# Patient Record
Sex: FEMALE | Race: WHITE | NOT HISPANIC OR LATINO | Employment: OTHER
[De-identification: names, ages, dates, MRNs, and addresses within clinical notes are randomized per-mention and may not be internally consistent; named-entity substitution may affect disease eponyms.]

---

## 2024-02-07 ENCOUNTER — TRANSCRIBE ORDERS (OUTPATIENT)
Dept: OTHER | Age: 34
End: 2024-02-07

## 2024-02-07 DIAGNOSIS — M25.511 ACUTE PAIN OF RIGHT SHOULDER: Primary | ICD-10-CM

## 2024-02-08 ENCOUNTER — TRANSFERRED RECORDS (OUTPATIENT)
Dept: HEALTH INFORMATION MANAGEMENT | Facility: CLINIC | Age: 34
End: 2024-02-08
Payer: COMMERCIAL

## 2024-02-09 NOTE — PROGRESS NOTES
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  Symptoms started in May of 2023. There was a precipitating event where a metal water bottle fell on her shoulder. The pain is located to the posterior shoulder. Worst pain is rated a 9 of 10, and current pain is rated at 6 of 10. Symptoms are worsened by movement and lifting her arm. Symptoms are improved with rest. Patient has tried medrol dose pack with good relief. Associated symptoms include pain, weakness, and limited functionality. Patient has pain, coldness and tingling radiating down the arm, with numbness. Notably, the patient has had no history of surgery on the shoulder. No other concerns or complaints at this time.  SANE score R - 15/20 L - 85    PAST MEDICAL HISTORY:  No past medical history on file.    PAST SURGICAL HISTORY:  No past surgical history on file.    CURRENT MEDICATIONS:  No current outpatient medications on file.       ALLERGIES:    Not on File      FAMILY HISTORY: No pertinent family history, reviewed in EMR.    SOCIAL HISTORY:   Social History     Socioeconomic History    Marital status:      Spouse name: Not on file    Number of children: Not on file    Years of education: Not on file    Highest education level: Not on file   Occupational History    Not on file   Tobacco Use    Smoking status: Not on file    Smokeless tobacco: Not on file   Substance and Sexual Activity    Alcohol use: Not on file    Drug use: Not on file    Sexual activity: Not on file   Other Topics Concern    Not on file   Social History Narrative    Not on file     Social Determinants of Health     Financial Resource Strain: Not on file   Food Insecurity: Not on file   Transportation Needs: Not on file    Physical Activity: Not on file   Stress: Not on file   Social Connections: Not on file   Interpersonal Safety: Not on file   Housing Stability: Not on file       REVIEW OF SYSTEMS: Positive for that noted in past medical history and history of present illness and otherwise reviewed in EMR    PHYSICAL EXAM:  Patient is Data Unavailable and weighs 0 lbs 0 oz There were no vitals taken for this visit.  There is no height or weight on file to calculate BMI.   Constitutional: Well-developed, well-nourished, healthy appearing female.  Skin: Warm, dry   HEENT: Normal  Cardiac: Well perfused extremities, strong 2+ peripheral pulses. No edema.   Pulmonary: Breathing room air    Musculoskeletal:   Right Shoulder:  AROM right shoulder: 170/170/70/T10   AROM left shoulder: 170/170/70/T10   5/5 supraspinatus, 5/5 infraspinatus, 5/5 subscapularis  positive AC joint pain, positive cross body adduction  negative Neer and Figueroa impingement signs  negative belly-press/lift-off  negative Speed's test  negative Apprehension  No scapular winging  Neurovascular exam and cervical spine exam are normal.    IMAGING: I personally reviewed the prior x-rays and MRI which demonstrate no evidence of a rotator cuff tear, labral tear.  There are some edema within the distal clavicle and at the AC joint.  No muscle atrophy.    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     PLAN:     I discussed with the patient the etiology of their condition. We discussed at length the options as noted above.  I went through the results of the x-ray, MRI, EMG with the patient today.  I discussed with the patient that I believe that the pain is coming from both some scapular dyskinesis as well as from edema within the clavicle.  I talked about  conservative measures including and starting with physical therapy, the patient is only tried occupational therapy.  I believe the patient needs formal physical therapy to work on scapular motion and dyskinesis.  We also talked about an AC joint injection versus iontophoresis over the area.  Patient is going to pursue this with physical therapy.  A mental health referral was also made today.  Patient can follow-up with me as needed.    At the conclusion of the office visit, Alida verbally acknowledged that I answered all of her questions satisfactorily.    Haylee Garcia MD  Orthopedic Surgery Sports Medicine and Shoulder Surgery

## 2024-02-13 NOTE — TELEPHONE ENCOUNTER
DIAGNOSIS: RIGHT SHOULDER   APPOINTMENT DATE: 02/14/2024   NOTES STATUS DETAILS   OFFICE NOTE from referring provider Care Everywhere 02/02/2024 - Brennon Guzman MD - [facility]   OFFICE NOTE from other specialist Care Everywhere 09/26/2023 - Silke Wilson [facility]   MRI PACS [facility]:  12/22/2023 - RT Shoulder   XRAYS (IMAGES & REPORTS) PACS [facility]:  01/23/2024 - RT Shoulder  [facility]:  11/02/2023 - RT Clavicle

## 2024-02-14 ENCOUNTER — PRE VISIT (OUTPATIENT)
Dept: ORTHOPEDICS | Facility: CLINIC | Age: 34
End: 2024-02-14

## 2024-02-14 ENCOUNTER — OFFICE VISIT (OUTPATIENT)
Dept: ORTHOPEDICS | Facility: CLINIC | Age: 34
End: 2024-02-14
Attending: ORTHOPAEDIC SURGERY
Payer: COMMERCIAL

## 2024-02-14 VITALS — HEIGHT: 65 IN | WEIGHT: 202.1 LBS | BODY MASS INDEX: 33.67 KG/M2

## 2024-02-14 DIAGNOSIS — G25.89 SCAPULAR DYSKINESIS: ICD-10-CM

## 2024-02-14 DIAGNOSIS — M19.019 AC JOINT ARTHROPATHY: Primary | ICD-10-CM

## 2024-02-14 DIAGNOSIS — M25.511 ACUTE PAIN OF RIGHT SHOULDER: ICD-10-CM

## 2024-02-14 PROBLEM — F41.9 ANXIETY: Status: ACTIVE | Noted: 2024-02-14

## 2024-02-14 PROBLEM — Q79.60 EHLERS-DANLOS SYNDROME: Chronic | Status: ACTIVE | Noted: 2017-06-20

## 2024-02-14 PROBLEM — F33.41 RECURRENT MAJOR DEPRESSIVE DISORDER, IN PARTIAL REMISSION (H): Chronic | Status: ACTIVE | Noted: 2017-05-31

## 2024-02-14 PROCEDURE — 99204 OFFICE O/P NEW MOD 45 MIN: CPT | Performed by: ORTHOPAEDIC SURGERY

## 2024-02-14 RX ORDER — FLUTICASONE PROPIONATE 50 MCG
SPRAY, SUSPENSION (ML) NASAL
COMMUNITY
Start: 2022-03-21

## 2024-02-14 RX ORDER — NITROFURANTOIN 25; 75 MG/1; MG/1
CAPSULE ORAL
COMMUNITY
Start: 2024-01-19

## 2024-02-14 RX ORDER — ALPRAZOLAM 0.5 MG
1 TABLET ORAL DAILY PRN
COMMUNITY
Start: 2023-11-27

## 2024-02-14 RX ORDER — PREGABALIN 150 MG/1
150 CAPSULE ORAL
COMMUNITY
Start: 2024-02-02 | End: 2024-03-03

## 2024-02-14 RX ORDER — FLUCONAZOLE 150 MG/1
TABLET ORAL
COMMUNITY

## 2024-02-14 RX ORDER — MAGNESIUM OXIDE 400 MG/1
200 TABLET ORAL
COMMUNITY

## 2024-02-14 RX ORDER — ACETAMINOPHEN 500 MG
1000 TABLET ORAL
COMMUNITY
Start: 2023-04-05

## 2024-02-14 RX ORDER — FEXOFENADINE HCL 180 MG/1
TABLET ORAL
COMMUNITY
Start: 2023-06-05

## 2024-02-14 RX ORDER — ETODOLAC 400 MG
400 TABLET ORAL
COMMUNITY
Start: 2024-02-02 | End: 2024-03-03

## 2024-02-14 ASSESSMENT — PATIENT HEALTH QUESTIONNAIRE - PHQ9: SUM OF ALL RESPONSES TO PHQ QUESTIONS 1-9: 21

## 2024-02-14 NOTE — NURSING NOTE
[facility]:  Nursing Note  SITUATION/BACKGROUND                                                    Alida Winn is a 34 year old female who completed the PHQ-9 assessment for depression and scored >9.  Question 9 on the PHQ-9 was positive for suicidal ideation.    Onset of symptoms: many years  Triggers: anything with her right shoulder, amount of health care visits she has  Recent related events: right shoulder injury  Prior history of suicide attempt or self harm: No (Pt did not go into detail)  Risk Factors: age and anxiety  History of depression or mental illness: Yes  Medications reviewed: Yes    We mostly discussed the care she has received and is currently receiving. Pt stated many times that she is very familiar with the healthcare system so she understands why I need to be speaking with her today, but declined any referrals and assistance with care at this time. Pt does not like the care she is getting, but states that she has been getting consistent care locally and has an appt this Friday.     Nicanor Inman RN

## 2024-02-14 NOTE — PATIENT INSTRUCTIONS
Mental Health Referral [phone]    Mental Health Home Care Instructions:  If currently in counseling, call counselor for appointment  Call local crisis interventions  Contact friends or family for support  Increase exercise and enjoyable activities  East a well-balanced diet, drink plenty of fluids and rest as needed  Alcohol and other recreational drugs can worsen depression.  If heavy use of drugs or alcohol, contact counselor or PCP to help reduce consumption    Report the following to your PCP  Suicidal thoughts without a plan or means to carry out the plan  Depression interferes with daily activities  Persistent inability to sleep     Seek emergency care immediately if any of the following occur  Suicidal thoughts and plan and means to carry out the plan  Injury to self or others  Altered mental status: confusion, delusional, psychotic

## 2024-07-28 ENCOUNTER — HEALTH MAINTENANCE LETTER (OUTPATIENT)
Age: 34
End: 2024-07-28

## 2025-08-10 ENCOUNTER — HEALTH MAINTENANCE LETTER (OUTPATIENT)
Age: 35
End: 2025-08-10